# Patient Record
Sex: FEMALE | Race: WHITE | HISPANIC OR LATINO
[De-identification: names, ages, dates, MRNs, and addresses within clinical notes are randomized per-mention and may not be internally consistent; named-entity substitution may affect disease eponyms.]

---

## 2023-02-27 ENCOUNTER — DASHBOARD ENCOUNTERS (OUTPATIENT)
Age: 48
End: 2023-02-27

## 2023-02-27 ENCOUNTER — OFFICE VISIT (OUTPATIENT)
Dept: URBAN - METROPOLITAN AREA CLINIC 78 | Facility: CLINIC | Age: 48
End: 2023-02-27
Payer: COMMERCIAL

## 2023-02-27 VITALS
TEMPERATURE: 98.1 F | RESPIRATION RATE: 16 BRPM | SYSTOLIC BLOOD PRESSURE: 120 MMHG | HEART RATE: 93 BPM | HEIGHT: 68 IN | WEIGHT: 215.2 LBS | DIASTOLIC BLOOD PRESSURE: 79 MMHG | BODY MASS INDEX: 32.61 KG/M2

## 2023-02-27 DIAGNOSIS — Z12.11 COLON CANCER SCREENING: ICD-10-CM

## 2023-02-27 DIAGNOSIS — M32.9 PERSONAL HISTORY OF SYSTEMIC LUPUS ERYTHEMATOSUS (SLE): ICD-10-CM

## 2023-02-27 PROCEDURE — 99203 OFFICE O/P NEW LOW 30 MIN: CPT | Performed by: INTERNAL MEDICINE

## 2023-02-27 RX ORDER — FAMOTIDINE 20 MG/1
1 TABLET AT BEDTIME AS NEEDED TABLET, FILM COATED ORAL ONCE A DAY
Status: ACTIVE | COMMUNITY

## 2023-02-27 RX ORDER — SPIRONOLACTONE 25 MG/5ML
2 ML SUSPENSION ORAL
Status: ACTIVE | COMMUNITY

## 2023-02-27 RX ORDER — METHOTREXATE SODIUM 2.5 MG/1
AS DIRECTED TABLET ORAL
Status: ACTIVE | COMMUNITY

## 2023-02-27 RX ORDER — GABAPENTIN 300 MG/1
1 CAPSULE CAPSULE ORAL ONCE A DAY
Status: ACTIVE | COMMUNITY

## 2023-02-27 RX ORDER — FOLIC ACID 20 MG
AS DIRECTED CAPSULE ORAL
Status: ACTIVE | COMMUNITY

## 2023-02-27 RX ORDER — APREMILAST 30 MG/1
1 TABLET TABLET, FILM COATED ORAL TWICE A DAY
Status: ACTIVE | COMMUNITY

## 2023-02-27 RX ORDER — SODIUM PICOSULFATE, MAGNESIUM OXIDE, AND ANHYDROUS CITRIC ACID 10; 3.5; 12 MG/160ML; G/160ML; G/160ML
160 ML DAY #1 AND 160 ML DAY # 2 LIQUID ORAL ONCE A DAY
Qty: 320 MILLILITER | OUTPATIENT

## 2023-02-27 RX ORDER — LEVOTHYROXINE SODIUM 112 UG/1
1 TABLET IN THE MORNING ON AN EMPTY STOMACH TABLET ORAL ONCE A DAY
Status: ACTIVE | COMMUNITY

## 2023-02-27 RX ORDER — METFORMIN HYDROCHLORIDE 1000 MG/1
1 TABLET WITH A MEAL TABLET, FILM COATED ORAL ONCE A DAY
Status: ACTIVE | COMMUNITY

## 2023-02-27 NOTE — HPI-TODAY'S VISIT:
Patient was referred by Dr. Mark Bland  A copy of this document will be sent to the physician.   PatientIs for routine colonoscopy in the interval she has a diagnosis of lupus with lupus and Raynaud's fairly well controlled.  She also sees Dr. Bland for weight loss ( Trulicity 3 mg and Metformin for PCOS)   The patient presents for a colon cancer screening. There is no family history of colon polyps or cancer. Patient denies change in bowel habits, appetite, and weight. Patient denies bleeding per rectum. Last colonoscopy: 2009   Mom has late onset Crohns disease     Denies chest pain  Denies SOB  Denies easy bruising  Denies anesthesia complication    Last labs Dec 17 2022   CBC is normal with Hg 12.7  CMP is normal except Cr 1.17  Ferritin 44

## 2023-03-31 ENCOUNTER — TELEPHONE ENCOUNTER (OUTPATIENT)
Dept: URBAN - METROPOLITAN AREA CLINIC 78 | Facility: CLINIC | Age: 48
End: 2023-03-31

## 2023-04-13 ENCOUNTER — OFFICE VISIT (OUTPATIENT)
Dept: URBAN - METROPOLITAN AREA SURGERY CENTER 15 | Facility: SURGERY CENTER | Age: 48
End: 2023-04-13
Payer: COMMERCIAL

## 2023-04-13 DIAGNOSIS — Z12.11 AVERAGE RISK FOR CRC. DUE TO PT'S CO-MORBID STATE WITH END STAGE DEMENTIA, HIGH RISK FOR ANESTHESIA (PER NEUROLOGY); INABILITY TO TAKE A BOWEL PREP....WOULD NOT ADVISE ANY COLORECTAL CANCER SCREENING INCLUDING STOOL TEST FOR FECAL BLOOD.: ICD-10-CM

## 2023-04-13 PROCEDURE — G0121 COLON CA SCRN NOT HI RSK IND: HCPCS | Performed by: INTERNAL MEDICINE

## 2023-04-13 PROCEDURE — G8907 PT DOC NO EVENTS ON DISCHARG: HCPCS | Performed by: INTERNAL MEDICINE
